# Patient Record
Sex: MALE | Race: WHITE | NOT HISPANIC OR LATINO | URBAN - METROPOLITAN AREA
[De-identification: names, ages, dates, MRNs, and addresses within clinical notes are randomized per-mention and may not be internally consistent; named-entity substitution may affect disease eponyms.]

---

## 2022-01-06 ENCOUNTER — OFFICE VISIT (OUTPATIENT)
Dept: URBAN - METROPOLITAN AREA CLINIC 94 | Facility: CLINIC | Age: 52
End: 2022-01-06

## 2022-01-12 ENCOUNTER — CLAIMS CREATED FROM THE CLAIM WINDOW (OUTPATIENT)
Dept: URBAN - METROPOLITAN AREA CLINIC 94 | Facility: CLINIC | Age: 52
End: 2022-01-12
Payer: COMMERCIAL

## 2022-01-12 ENCOUNTER — WEB ENCOUNTER (OUTPATIENT)
Dept: URBAN - METROPOLITAN AREA CLINIC 94 | Facility: CLINIC | Age: 52
End: 2022-01-12

## 2022-01-12 VITALS
TEMPERATURE: 97.3 F | SYSTOLIC BLOOD PRESSURE: 123 MMHG | HEIGHT: 67 IN | BODY MASS INDEX: 24.33 KG/M2 | HEART RATE: 63 BPM | DIASTOLIC BLOOD PRESSURE: 84 MMHG | WEIGHT: 155 LBS

## 2022-01-12 DIAGNOSIS — R10.10 UPPER ABDOMINAL PAIN: ICD-10-CM

## 2022-01-12 DIAGNOSIS — K76.89 HEPATIC CYST: ICD-10-CM

## 2022-01-12 DIAGNOSIS — N28.1 RENAL CYST: ICD-10-CM

## 2022-01-12 DIAGNOSIS — K82.8 GALLBLADDER SLUDGE: ICD-10-CM

## 2022-01-12 PROBLEM — 85057007: Status: ACTIVE | Noted: 2022-01-12

## 2022-01-12 PROCEDURE — 99204 OFFICE O/P NEW MOD 45 MIN: CPT | Performed by: PHYSICIAN ASSISTANT

## 2022-01-12 PROCEDURE — 99204 OFFICE O/P NEW MOD 45 MIN: CPT | Performed by: INTERNAL MEDICINE

## 2022-01-12 RX ORDER — OMEPRAZOLE 40 MG/1
1 CAPSULE 30 MINUTES BEFORE MORNING MEAL CAPSULE, DELAYED RELEASE ORAL ONCE A DAY
Status: ACTIVE | COMMUNITY

## 2022-01-12 NOTE — HPI-TODAY'S VISIT:
50 yo M evaluated today for abdominal discomfort. Pt was referred to clinic by PCP, DR Souza. A copy of patient records will be faxed to referring MD for review. The patient is accompanied by his wife.   The patient reports 2 mos hx of upper abdominal discomfort which is worse over epigastric and RUQ. Pt reports on occasion noting burning but otherwise but otherwise denies N/V, changes in bowel habits, diarrhea or constipation. He denies any association with food. Pt reports having periodic acid reflux. He also reports taking his wife's Omeprazole 40 mg x 1 mos without change so he stopped. Pt reports GB hx in family and father  of GB cancer   US 2021 Hypoechoic lesions in the kidneys bilaterally, The gallbladder demonstrates sludge without gallbladder wall thickening or pericholecystic fluid. Pancreas appears  unremarkable where visualized  Pt last seen in 2017 with hx of diarrhea.  Colonoscopy 2017 - normal colon with internal hemorrhoids. Bx reveals normal TI and normal random colon bx.  Celiac Panel, CRP and TSH normal

## 2022-01-19 LAB
ALBUMIN: 4.9
ALKALINE PHOSPHATASE: 63
ALT (SGPT): 26
AST (SGOT): 23
BILIRUBIN, DIRECT: 0.13
BILIRUBIN, TOTAL: 0.4
PROTEIN, TOTAL: 7.9

## 2022-01-28 ENCOUNTER — TELEPHONE ENCOUNTER (OUTPATIENT)
Dept: URBAN - METROPOLITAN AREA CLINIC 94 | Facility: CLINIC | Age: 52
End: 2022-01-28

## 2022-02-01 ENCOUNTER — WEB ENCOUNTER (OUTPATIENT)
Dept: URBAN - METROPOLITAN AREA CLINIC 94 | Facility: CLINIC | Age: 52
End: 2022-02-01

## 2022-02-02 ENCOUNTER — TELEPHONE ENCOUNTER (OUTPATIENT)
Dept: URBAN - METROPOLITAN AREA CLINIC 94 | Facility: CLINIC | Age: 52
End: 2022-02-02

## 2022-02-07 ENCOUNTER — OFFICE VISIT (OUTPATIENT)
Dept: URBAN - METROPOLITAN AREA CLINIC 94 | Facility: CLINIC | Age: 52
End: 2022-02-07

## 2022-02-23 ENCOUNTER — OFFICE VISIT (OUTPATIENT)
Dept: URBAN - METROPOLITAN AREA SURGERY CENTER 17 | Facility: SURGERY CENTER | Age: 52
End: 2022-02-23
Payer: COMMERCIAL

## 2022-02-23 DIAGNOSIS — K29.60 ADENOPAPILLOMATOSIS GASTRICA: ICD-10-CM

## 2022-02-23 DIAGNOSIS — K21.00 ALKALINE REFLUX ESOPHAGITIS: ICD-10-CM

## 2022-02-23 DIAGNOSIS — R10.13 ABDOMINAL DISCOMFORT, EPIGASTRIC: ICD-10-CM

## 2022-02-23 PROCEDURE — 43239 EGD BIOPSY SINGLE/MULTIPLE: CPT | Performed by: INTERNAL MEDICINE

## 2022-02-23 PROCEDURE — G8907 PT DOC NO EVENTS ON DISCHARG: HCPCS | Performed by: INTERNAL MEDICINE

## 2022-02-23 RX ORDER — OMEPRAZOLE 40 MG/1
1 CAPSULE 30 MINUTES BEFORE MORNING MEAL CAPSULE, DELAYED RELEASE ORAL ONCE A DAY
Status: ACTIVE | COMMUNITY

## 2023-01-04 ENCOUNTER — OFFICE VISIT (OUTPATIENT)
Dept: URBAN - METROPOLITAN AREA CLINIC 94 | Facility: CLINIC | Age: 53
End: 2023-01-04
Payer: COMMERCIAL

## 2023-01-04 ENCOUNTER — WEB ENCOUNTER (OUTPATIENT)
Dept: URBAN - METROPOLITAN AREA CLINIC 94 | Facility: CLINIC | Age: 53
End: 2023-01-04

## 2023-01-04 VITALS
BODY MASS INDEX: 25.58 KG/M2 | DIASTOLIC BLOOD PRESSURE: 92 MMHG | TEMPERATURE: 97.2 F | SYSTOLIC BLOOD PRESSURE: 134 MMHG | WEIGHT: 163 LBS | HEART RATE: 69 BPM | HEIGHT: 67 IN

## 2023-01-04 DIAGNOSIS — R10.33 ABDOMINAL PAIN, ACUTE, PERIUMBILICAL: ICD-10-CM

## 2023-01-04 DIAGNOSIS — Z12.11 ENCOUNTER FOR SCREENING COLONOSCOPY: ICD-10-CM

## 2023-01-04 DIAGNOSIS — K58.2 IRRITABLE BOWEL SYNDROME WITH BOTH CONSTIPATION AND DIARRHEA: ICD-10-CM

## 2023-01-04 PROBLEM — 10743008: Status: ACTIVE | Noted: 2023-01-04

## 2023-01-04 PROCEDURE — 99213 OFFICE O/P EST LOW 20 MIN: CPT | Performed by: INTERNAL MEDICINE

## 2023-01-04 RX ORDER — OMEPRAZOLE 40 MG/1
1 CAPSULE 30 MINUTES BEFORE MORNING MEAL CAPSULE, DELAYED RELEASE ORAL ONCE A DAY
Status: ON HOLD | COMMUNITY

## 2023-01-04 NOTE — HPI-TODAY'S VISIT:
50 yo M evaluated today for abdominal discomfort.   Pt reports 4 weeks ago, while on business trip developed nausea along with diarrhea. He was fine the next day. Then few days later, he felt lower abdominal discomfort which really has not resolved. Pt reports this to be worse later in afternoon evening and better in the am. Pt reports sx are better after food. Pt does report high fat foods make sx worse. Bowel habits are solid  but not regular and may go 3-4 days without BM. DEnies melena or hematochezia. Pt does reports a 2 lb wt loss. Pt did stop Align x 1 mos ago and restarted another brand 2 weeks ago and has noticed no change with or without it. Pt also reports taking Famotidine daily. He tried Omeprazole x 1 week to see if sx improved. He noticed no change in sx so he went back on Famotidine  Pt denies excessive gas or bloating. Denies N/V or acid reflux. Pt does report increased stress with work lately.   HIDA normal EF 77%  US 11/2021 Hypoechoic lesions in the kidneys bilaterally, The gallbladder demonstrates sludge without gallbladder wall thickening or pericholecystic fluid. Pancreas appears  unremarkable where visualized  Colonoscopy 2017 - normal colon with internal hemorrhoids. Bx reveals normal TI and normal random colon bx.  Celiac Panel, CRP and TSH normal 2017 Father diet of GB cancer.

## 2023-01-09 ENCOUNTER — WEB ENCOUNTER (OUTPATIENT)
Dept: URBAN - METROPOLITAN AREA CLINIC 94 | Facility: CLINIC | Age: 53
End: 2023-01-09

## 2023-01-27 ENCOUNTER — OFFICE VISIT (OUTPATIENT)
Dept: URBAN - METROPOLITAN AREA SURGERY CENTER 17 | Facility: SURGERY CENTER | Age: 53
End: 2023-01-27

## 2023-02-03 ENCOUNTER — CLAIMS CREATED FROM THE CLAIM WINDOW (OUTPATIENT)
Dept: URBAN - METROPOLITAN AREA CLINIC 4 | Facility: CLINIC | Age: 53
End: 2023-02-03
Payer: COMMERCIAL

## 2023-02-03 ENCOUNTER — OFFICE VISIT (OUTPATIENT)
Dept: URBAN - METROPOLITAN AREA SURGERY CENTER 17 | Facility: SURGERY CENTER | Age: 53
End: 2023-02-03
Payer: COMMERCIAL

## 2023-02-03 DIAGNOSIS — R19.7 ACUTE DIARRHEA: ICD-10-CM

## 2023-02-03 DIAGNOSIS — K63.89 OTHER SPECIFIED DISEASES OF INTESTINE: ICD-10-CM

## 2023-02-03 DIAGNOSIS — K63.89 APPENDICITIS EPIPLOICA: ICD-10-CM

## 2023-02-03 PROCEDURE — 88313 SPECIAL STAINS GROUP 2: CPT | Performed by: PATHOLOGY

## 2023-02-03 PROCEDURE — 45380 COLONOSCOPY AND BIOPSY: CPT | Performed by: INTERNAL MEDICINE

## 2023-02-03 PROCEDURE — 88342 IMHCHEM/IMCYTCHM 1ST ANTB: CPT | Performed by: PATHOLOGY

## 2023-02-03 PROCEDURE — 88305 TISSUE EXAM BY PATHOLOGIST: CPT | Performed by: PATHOLOGY

## 2023-02-03 PROCEDURE — G8907 PT DOC NO EVENTS ON DISCHARG: HCPCS | Performed by: INTERNAL MEDICINE

## 2023-02-03 RX ORDER — OMEPRAZOLE 40 MG/1
1 CAPSULE 30 MINUTES BEFORE MORNING MEAL CAPSULE, DELAYED RELEASE ORAL ONCE A DAY
Status: ON HOLD | COMMUNITY

## 2023-02-07 ENCOUNTER — WEB ENCOUNTER (OUTPATIENT)
Dept: URBAN - METROPOLITAN AREA CLINIC 94 | Facility: CLINIC | Age: 53
End: 2023-02-07

## 2023-02-16 ENCOUNTER — OFFICE VISIT (OUTPATIENT)
Dept: URBAN - METROPOLITAN AREA CLINIC 94 | Facility: CLINIC | Age: 53
End: 2023-02-16
Payer: COMMERCIAL

## 2023-02-16 VITALS
SYSTOLIC BLOOD PRESSURE: 127 MMHG | DIASTOLIC BLOOD PRESSURE: 77 MMHG | WEIGHT: 157 LBS | BODY MASS INDEX: 24.64 KG/M2 | HEIGHT: 67 IN | TEMPERATURE: 97.5 F | HEART RATE: 68 BPM

## 2023-02-16 DIAGNOSIS — K76.0 FATTY LIVER: ICD-10-CM

## 2023-02-16 DIAGNOSIS — K58.1 IRRITABLE BOWEL SYNDROME WITH CONSTIPATION: ICD-10-CM

## 2023-02-16 PROBLEM — 197321007: Status: ACTIVE | Noted: 2023-02-16

## 2023-02-16 PROBLEM — 440630006: Status: ACTIVE | Noted: 2023-02-16

## 2023-02-16 PROCEDURE — 99213 OFFICE O/P EST LOW 20 MIN: CPT | Performed by: INTERNAL MEDICINE

## 2023-02-16 RX ORDER — OMEPRAZOLE 40 MG/1
1 CAPSULE 30 MINUTES BEFORE MORNING MEAL CAPSULE, DELAYED RELEASE ORAL ONCE A DAY
COMMUNITY

## 2023-02-16 NOTE — HPI-TODAY'S VISIT:
50 yo M evaluated today for post procedure f/u visit with hx of abdominal discomfort.   Colonoscopy - 1/2023 - reveals normal colon and IH  CT revealed diffuse fatty liver with hepatic cyst and bilateral renal cyst.   Today patient reports some sx are better and others have changed. He did begin fiber supplement - 1 tsp daily which seems to help but still reports some constipation. He also rpeorts the abdominal discomfort can be diffuse through his abdomen and feels like pins and needles. He denies excessive bloating or gas. Pt reports heart burn on occasion and will use Famotidine or Omeprazole prn.   HIDA normal EF 77%  US 11/2021 Hypoechoic lesions in the kidneys bilaterally, The gallbladder demonstrates sludge without gallbladder wall thickening or pericholecystic fluid. Pancreas appears  unremarkable where visualized  Colonoscopy 2017 - normal colon with internal hemorrhoids. Bx reveals normal TI and normal random colon bx.  Celiac Panel, CRP and TSH normal 2017 Father diet of GB cancer.

## 2023-02-17 LAB
A/G RATIO: 1.7
ABSOLUTE BASOPHILS: 49
ABSOLUTE EOSINOPHILS: 140
ABSOLUTE LYMPHOCYTES: 2269
ABSOLUTE MONOCYTES: 500
ABSOLUTE NEUTROPHILS: 3142
ALBUMIN: 4.6
ALKALINE PHOSPHATASE: 45
ALT (SGPT): 22
AST (SGOT): 18
BASOPHILS: 0.8
BILIRUBIN, TOTAL: 0.7
BUN/CREATININE RATIO: (no result)
BUN: 16
CALCIUM: 10
CARBON DIOXIDE, TOTAL: 29
CHLORIDE: 103
CREATININE: 0.98
EGFR: 92
EOSINOPHILS: 2.3
GLOBULIN, TOTAL: 2.7
GLUCOSE: 91
HEMATOCRIT: 46.2
HEMOGLOBIN: 15.5
LYMPHOCYTES: 37.2
MCH: 29
MCHC: 33.5
MCV: 86.4
MONOCYTES: 8.2
MPV: 10.5
NEUTROPHILS: 51.5
PLATELET COUNT: 281
POTASSIUM: 4.6
PROTEIN, TOTAL: 7.3
RDW: 13
RED BLOOD CELL COUNT: 5.35
SODIUM: 140
WHITE BLOOD CELL COUNT: 6.1

## 2024-03-21 ENCOUNTER — OV EP (OUTPATIENT)
Dept: URBAN - METROPOLITAN AREA CLINIC 94 | Facility: CLINIC | Age: 54
End: 2024-03-21
Payer: COMMERCIAL

## 2024-03-21 VITALS
BODY MASS INDEX: 20.72 KG/M2 | DIASTOLIC BLOOD PRESSURE: 76 MMHG | HEART RATE: 60 BPM | SYSTOLIC BLOOD PRESSURE: 115 MMHG | HEIGHT: 67 IN | WEIGHT: 132 LBS | OXYGEN SATURATION: 98 % | TEMPERATURE: 97.2 F

## 2024-03-21 DIAGNOSIS — K58.1 IRRITABLE BOWEL SYNDROME WITH CONSTIPATION: ICD-10-CM

## 2024-03-21 DIAGNOSIS — R63.4 WEIGHT LOSS: ICD-10-CM

## 2024-03-21 DIAGNOSIS — K76.0 FATTY LIVER: ICD-10-CM

## 2024-03-21 PROCEDURE — 99213 OFFICE O/P EST LOW 20 MIN: CPT | Performed by: PHYSICIAN ASSISTANT

## 2024-03-21 RX ORDER — FAMOTIDINE 20 MG/1
1 TABLET AT BEDTIME AS NEEDED TABLET, FILM COATED ORAL ONCE A DAY
Status: ON HOLD | COMMUNITY

## 2024-03-21 NOTE — HPI-TODAY'S VISIT:
54 yo M evaluated today for post procedure f/u visit with hx of abdominal discomfort.   Since his last visit, patient started Mediterranean Diet due to fatty liver. He initially felt some indigestion but this resolved. He was doing well and actually had more energy. Bowel habits were regular unless he travels or during times of times. Reports more constipation. Sleep 5-7 hours/night.   He then developed abdominal discomfort. Can be RUQ but may also present in lower abdomen. Denies nausea. It is intermittent, typically post prandial 20-30 mins and also more noticeable in the evening. Denies heart burn,   Pt also has appt with PCP, wt loss. 20-25 lbs over the past yr likely related to Mediterranean Diet. Lately he has lost even more wt.   Colonoscopy - 1/2023 - reveals normal colon and IH   CT revealed diffuse fatty liver with hepatic cyst and bilateral renal cyst.  HIDA normal EF 77%   US 11/2021 Hypoechoic lesions in the kidneys bilaterally, The gallbladder demonstrates sludge without gallbladder wall thickening or pericholecystic fluid. Pancreas appears  unremarkable where visualized  Celiac Panel, CRP and TSH normal 2017 Father diet of GB cancer.

## 2024-03-22 LAB
A/G RATIO: 1.9
ALBUMIN: 4.8
ALKALINE PHOSPHATASE: 48
ALT (SGPT): 21
AST (SGOT): 19
BASO (ABSOLUTE): 0.1
BASOS: 1
BILIRUBIN, TOTAL: 0.7
BUN/CREATININE RATIO: 16
BUN: 14
CALCIUM: 10.1
CARBON DIOXIDE, TOTAL: 26
CHLORIDE: 101
CREATININE: 0.9
EGFR: 101
EOS (ABSOLUTE): 0.1
EOS: 1
GLOBULIN, TOTAL: 2.5
GLUCOSE: 103
HEMATOCRIT: 49.7
HEMOGLOBIN: 16.2
IMMATURE GRANS (ABS): 0
IMMATURE GRANULOCYTES: 0
LYMPHS (ABSOLUTE): 1.8
LYMPHS: 35
MCH: 29.3
MCHC: 32.6
MCV: 90
MONOCYTES(ABSOLUTE): 0.3
MONOCYTES: 6
NEUTROPHILS (ABSOLUTE): 3
NEUTROPHILS: 57
PLATELETS: 271
POTASSIUM: 4.6
PROTEIN, TOTAL: 7.3
RBC: 5.52
RDW: 12.7
SODIUM: 139
TSH: 3.94
WBC: 5.3

## 2024-05-16 ENCOUNTER — APPOINTMENT (RX ONLY)
Dept: URBAN - METROPOLITAN AREA CLINIC 41 | Facility: CLINIC | Age: 54
Setting detail: DERMATOLOGY
End: 2024-05-16

## 2024-05-16 DIAGNOSIS — L57.3 POIKILODERMA OF CIVATTE: ICD-10-CM

## 2024-05-16 DIAGNOSIS — D22 MELANOCYTIC NEVI: ICD-10-CM

## 2024-05-16 DIAGNOSIS — D18.0 HEMANGIOMA: ICD-10-CM

## 2024-05-16 DIAGNOSIS — L72.0 EPIDERMAL CYST: ICD-10-CM

## 2024-05-16 DIAGNOSIS — L81.4 OTHER MELANIN HYPERPIGMENTATION: ICD-10-CM

## 2024-05-16 DIAGNOSIS — T07XXXA INSECT BITE, NONVENOMOUS, OF OTHER, MULTIPLE, AND UNSPECIFIED SITES, WITHOUT MENTION OF INFECTION: ICD-10-CM

## 2024-05-16 PROBLEM — D22.61 MELANOCYTIC NEVI OF RIGHT UPPER LIMB, INCLUDING SHOULDER: Status: ACTIVE | Noted: 2024-05-16

## 2024-05-16 PROBLEM — D18.01 HEMANGIOMA OF SKIN AND SUBCUTANEOUS TISSUE: Status: ACTIVE | Noted: 2024-05-16

## 2024-05-16 PROBLEM — S80.861A INSECT BITE (NONVENOMOUS), RIGHT LOWER LEG, INITIAL ENCOUNTER: Status: ACTIVE | Noted: 2024-05-16

## 2024-05-16 PROBLEM — D22.5 MELANOCYTIC NEVI OF TRUNK: Status: ACTIVE | Noted: 2024-05-16

## 2024-05-16 PROBLEM — D23.62 OTHER BENIGN NEOPLASM OF SKIN OF LEFT UPPER LIMB, INCLUDING SHOULDER: Status: ACTIVE | Noted: 2024-05-16

## 2024-05-16 PROCEDURE — 99202 OFFICE O/P NEW SF 15 MIN: CPT

## 2024-05-16 PROCEDURE — ? COUNSELING

## 2024-05-16 ASSESSMENT — LOCATION DETAILED DESCRIPTION DERM
LOCATION DETAILED: POSTERIOR MID-PARIETAL SCALP
LOCATION DETAILED: LEFT CENTRAL MALAR CHEEK
LOCATION DETAILED: LEFT SUPERIOR MEDIAL UPPER BACK
LOCATION DETAILED: LEFT INFERIOR LATERAL NECK
LOCATION DETAILED: RIGHT PROXIMAL CALF
LOCATION DETAILED: LEFT ANTERIOR PROXIMAL THIGH
LOCATION DETAILED: LEFT MID-UPPER BACK
LOCATION DETAILED: INFERIOR THORACIC SPINE
LOCATION DETAILED: RIGHT CENTRAL MALAR CHEEK
LOCATION DETAILED: LEFT MEDIAL SUPERIOR CHEST
LOCATION DETAILED: RIGHT ANTERIOR PROXIMAL THIGH
LOCATION DETAILED: RIGHT ANTERIOR SHOULDER
LOCATION DETAILED: LEFT CLAVICULAR SKIN
LOCATION DETAILED: RIGHT INFERIOR LATERAL NECK

## 2024-05-16 ASSESSMENT — LOCATION ZONE DERM
LOCATION ZONE: TRUNK
LOCATION ZONE: FACE
LOCATION ZONE: NECK
LOCATION ZONE: LEG
LOCATION ZONE: SCALP
LOCATION ZONE: ARM

## 2024-05-16 ASSESSMENT — LOCATION SIMPLE DESCRIPTION DERM
LOCATION SIMPLE: RIGHT CHEEK
LOCATION SIMPLE: POSTERIOR SCALP
LOCATION SIMPLE: RIGHT SHOULDER
LOCATION SIMPLE: RIGHT ANTERIOR NECK
LOCATION SIMPLE: UPPER BACK
LOCATION SIMPLE: LEFT UPPER BACK
LOCATION SIMPLE: LEFT CLAVICULAR SKIN
LOCATION SIMPLE: CHEST
LOCATION SIMPLE: LEFT CHEEK
LOCATION SIMPLE: RIGHT THIGH
LOCATION SIMPLE: RIGHT CALF
LOCATION SIMPLE: LEFT ANTERIOR NECK
LOCATION SIMPLE: LEFT THIGH

## 2025-05-28 ENCOUNTER — APPOINTMENT (OUTPATIENT)
Dept: URBAN - METROPOLITAN AREA CLINIC 41 | Facility: CLINIC | Age: 55
Setting detail: DERMATOLOGY
End: 2025-05-28

## 2025-05-28 DIAGNOSIS — L82.1 OTHER SEBORRHEIC KERATOSIS: ICD-10-CM

## 2025-05-28 DIAGNOSIS — L81.4 OTHER MELANIN HYPERPIGMENTATION: ICD-10-CM

## 2025-05-28 DIAGNOSIS — L57.3 POIKILODERMA OF CIVATTE: ICD-10-CM

## 2025-05-28 DIAGNOSIS — D22 MELANOCYTIC NEVI: ICD-10-CM

## 2025-05-28 DIAGNOSIS — D18.0 HEMANGIOMA: ICD-10-CM

## 2025-05-28 DIAGNOSIS — L72.8 OTHER FOLLICULAR CYSTS OF THE SKIN AND SUBCUTANEOUS TISSUE: ICD-10-CM

## 2025-05-28 PROBLEM — D22.5 MELANOCYTIC NEVI OF TRUNK: Status: ACTIVE | Noted: 2025-05-28

## 2025-05-28 PROBLEM — D23.62 OTHER BENIGN NEOPLASM OF SKIN OF LEFT UPPER LIMB, INCLUDING SHOULDER: Status: ACTIVE | Noted: 2025-05-28

## 2025-05-28 PROBLEM — D18.01 HEMANGIOMA OF SKIN AND SUBCUTANEOUS TISSUE: Status: ACTIVE | Noted: 2025-05-28

## 2025-05-28 PROBLEM — D22.62 MELANOCYTIC NEVI OF LEFT UPPER LIMB, INCLUDING SHOULDER: Status: ACTIVE | Noted: 2025-05-28

## 2025-05-28 PROBLEM — D22.39 MELANOCYTIC NEVI OF OTHER PARTS OF FACE: Status: ACTIVE | Noted: 2025-05-28

## 2025-05-28 PROBLEM — D22.72 MELANOCYTIC NEVI OF LEFT LOWER LIMB, INCLUDING HIP: Status: ACTIVE | Noted: 2025-05-28

## 2025-05-28 PROBLEM — D22.61 MELANOCYTIC NEVI OF RIGHT UPPER LIMB, INCLUDING SHOULDER: Status: ACTIVE | Noted: 2025-05-28

## 2025-05-28 PROCEDURE — 99213 OFFICE O/P EST LOW 20 MIN: CPT

## 2025-05-28 PROCEDURE — ? PHOTO-DOCUMENTATION

## 2025-05-28 PROCEDURE — ? COUNSELING

## 2025-05-28 ASSESSMENT — LOCATION DETAILED DESCRIPTION DERM
LOCATION DETAILED: LEFT SUPERIOR MEDIAL UPPER BACK
LOCATION DETAILED: LEFT ANTERIOR LATERAL PROXIMAL UPPER ARM
LOCATION DETAILED: LEFT INFERIOR LATERAL NECK
LOCATION DETAILED: RIGHT INFERIOR LATERAL NECK
LOCATION DETAILED: RIGHT ANTERIOR LATERAL PROXIMAL UPPER ARM
LOCATION DETAILED: LEFT MID-UPPER BACK
LOCATION DETAILED: SUPERIOR THORACIC SPINE
LOCATION DETAILED: 3RD WEBSPACE LEFT FOOT
LOCATION DETAILED: RIGHT MEDIAL FOREHEAD
LOCATION DETAILED: INFERIOR THORACIC SPINE
LOCATION DETAILED: LEFT ANTERIOR DISTAL UPPER ARM
LOCATION DETAILED: RIGHT BUTTOCK
LOCATION DETAILED: LEFT CENTRAL POSTAURICULAR SKIN

## 2025-05-28 ASSESSMENT — LOCATION SIMPLE DESCRIPTION DERM
LOCATION SIMPLE: RIGHT ANTERIOR NECK
LOCATION SIMPLE: UPPER BACK
LOCATION SIMPLE: RIGHT UPPER ARM
LOCATION SIMPLE: RIGHT FOREHEAD
LOCATION SIMPLE: LEFT FOOT
LOCATION SIMPLE: LEFT UPPER ARM
LOCATION SIMPLE: LEFT UPPER BACK
LOCATION SIMPLE: LEFT ANTERIOR NECK
LOCATION SIMPLE: SCALP
LOCATION SIMPLE: RIGHT BUTTOCK

## 2025-05-28 ASSESSMENT — LOCATION ZONE DERM
LOCATION ZONE: TRUNK
LOCATION ZONE: ARM
LOCATION ZONE: FEET
LOCATION ZONE: NECK
LOCATION ZONE: FACE
LOCATION ZONE: SCALP

## 2025-05-28 NOTE — PROCEDURE: PHOTO-DOCUMENTATION
Photo Preface (Leave Blank If You Do Not Want): Photographs were obtained today
Detail Level: Zone
Details (Free Text): Right foot between the 3rd and 4th toe (to monitor - RTC in 3 months for stability check)

## 2025-08-28 ENCOUNTER — APPOINTMENT (OUTPATIENT)
Dept: URBAN - METROPOLITAN AREA CLINIC 41 | Facility: CLINIC | Age: 55
Setting detail: DERMATOLOGY
End: 2025-08-28

## 2025-08-28 DIAGNOSIS — D485 NEOPLASM OF UNCERTAIN BEHAVIOR OF SKIN: ICD-10-CM

## 2025-08-28 PROBLEM — D48.5 NEOPLASM OF UNCERTAIN BEHAVIOR OF SKIN: Status: ACTIVE | Noted: 2025-08-28

## 2025-08-28 PROCEDURE — ? BIOPSY BY SHAVE METHOD

## 2025-08-28 ASSESSMENT — LOCATION ZONE DERM: LOCATION ZONE: TOE

## 2025-08-28 ASSESSMENT — LOCATION SIMPLE DESCRIPTION DERM: LOCATION SIMPLE: LEFT 4TH TOE

## 2025-08-28 ASSESSMENT — LOCATION DETAILED DESCRIPTION DERM: LOCATION DETAILED: LEFT MEDIAL 4TH TOE
